# Patient Record
Sex: FEMALE | Race: BLACK OR AFRICAN AMERICAN | Employment: OTHER | URBAN - METROPOLITAN AREA
[De-identification: names, ages, dates, MRNs, and addresses within clinical notes are randomized per-mention and may not be internally consistent; named-entity substitution may affect disease eponyms.]

---

## 2019-02-11 ENCOUNTER — HOSPITAL ENCOUNTER (OUTPATIENT)
Dept: LAB | Age: 44
Discharge: HOME | End: 2019-02-11

## 2020-06-16 ENCOUNTER — VIRTUAL VISIT (OUTPATIENT)
Dept: FAMILY MEDICINE CLINIC | Age: 45
End: 2020-06-16

## 2020-06-16 DIAGNOSIS — R07.9 CHEST PAIN, UNSPECIFIED TYPE: ICD-10-CM

## 2020-06-16 DIAGNOSIS — H91.91 HEARING LOSS OF RIGHT EAR, UNSPECIFIED HEARING LOSS TYPE: ICD-10-CM

## 2020-06-16 DIAGNOSIS — Z76.89 ENCOUNTER TO ESTABLISH CARE: Primary | ICD-10-CM

## 2020-06-16 DIAGNOSIS — L60.8 LONGITUDINAL MELANONYCHIA: ICD-10-CM

## 2020-06-16 DIAGNOSIS — L60.2 THICKENED NAIL: ICD-10-CM

## 2020-06-16 DIAGNOSIS — Z87.09 HISTORY OF ASTHMA: ICD-10-CM

## 2020-06-16 RX ORDER — UREA 10 %
LOTION (ML) TOPICAL
COMMUNITY

## 2020-06-16 RX ORDER — MAGNESIUM OXIDE 240 MG
POWDER IN PACKET (EA) ORAL
COMMUNITY

## 2020-06-16 RX ORDER — CHOLECALCIFEROL (VITAMIN D3) 125 MCG
CAPSULE ORAL
COMMUNITY

## 2020-06-16 NOTE — PROGRESS NOTES
Chief Complaint   Patient presents with    New Patient     establish care    Chest Pain     \"lightening bult sensation\", random/ no pattern, x march 2020     Nail Problem     1. Have you been to the ER, urgent care clinic since your last visit? Hospitalized since your last visit? No    2. Have you seen or consulted any other health care providers outside of the [address] [place] [address] since your last visit? Include any pap smears or colon screening.  No

## 2020-06-16 NOTE — PROGRESS NOTES
Angie Coker [facility] Note      Subjective:     Chief Complaint   Patient presents with    New Patient     establish care    Chest Pain     \"lightening bult sensation\", random/ no pattern, x 2020     Nail Problem     Wesley Arguelles is a 53y.o. year old female who presents for virtual evaluation of the following:    Establishment of Care:  Previous PCP: Dr. Abel Sheehan, last seen 2019  Patient Care Team:  Elif Parekh MD as PCP - [facility])   Dentist- None  Optho- None  GYN- Dr Fab Centeno. PMH:   Meniere disease:  Right ear hearing loss, borderling of hearing loss  Tx: None  Previously managed by ENT    Asthma   Chronic since childhood  Tx: None  Triggers: allergies to shlefish and pistacio nuts, URI  Denies recent symptoms     Acute Concerns:  Chest pain  Onset: 2020  Character: \"lightning strikes\"  Across chest  Location: anterior chest  Duration:seconds to 1 minute  Frequency: random, last occurred 2 weeks ago  Current Pain Ratin  Treatment: None  Endorses: None  None smoker  Denies sweating, shortness of breath, fever, vomiting, diarrhea, constipation, chest tenderness      History of Insulin Resistance/ History of Gestational Diabetes  Tx: None  No results found for: HBA1C, HGBE8, GBK9LPQZ, XJR8TZSI      Toenail Abnormality:  Onset  Big toes with thickening of toenails  Big toes and left 2nd toe with dark lines  Denies pain, change in size of dark lines      Social:   Employment- unknown  Lives with unknown        Review of Systems   Pertinent positives and negative per HPI. All other systems  reviewed are negative for a Comprehensive ROS (10+).        Past Medical History:   Diagnosis Date    Hearing loss in right ear     Meniere disease         Social History     Socioeconomic History    Marital status:      Spouse name: Not on file    Number of children: Not on file    Years of education: Not on file    Highest education level: Not on file   Occupational History    Not on file   Social Needs    Financial resource strain: Not on file    Food insecurity     Worry: Not on file     Inability: Not on file    Transportation needs     Medical: Not on file     Non-medical: Not on file   Tobacco Use    Smoking status: Never Smoker    Smokeless tobacco: Never Used   Substance and Sexual Activity    Alcohol use: Yes     Comment: socially     Drug use: Never    Sexual activity: Yes     Partners: Male   Lifestyle    Physical activity     Days per week: Not on file     Minutes per session: Not on file    Stress: Not on file   Relationships    Social connections     Talks on phone: Not on file     Gets together: Not on file     Attends Sabianism service: Not on file     Active member of club or organization: Not on file     Attends meetings of clubs or organizations: Not on file     Relationship status: Not on file    Intimate partner violence     Fear of current or ex partner: Not on file     Emotionally abused: Not on file     Physically abused: Not on file     Forced sexual activity: Not on file   Other Topics Concern    Not on file   Social History Narrative    Not on file       Family History   Problem Relation Age of Onset    Hypertension Mother     No Known Problems Father     MS Sister     Diabetes Brother     Heart Attack Paternal Aunt     Alzheimer Paternal Uncle        Current Outpatient Medications   Medication Sig    BRE EXTRACT PO bre extract    melatonin 1 mg tablet melatonin    ascorbic acid, vitamin C, (ascorbic acid) 100 mg tab Vitamin C    blue-green algae (SPIRULINA) Spirulina    elderberry fruit (ELDERBERRY PO) Take  by mouth.  cholecalciferol, vitamin D3, (Vitamin D3) 50 mcg (2,000 unit) tab Take  by mouth.  magnesium oxide 240 mg magnesium pwpk Take  by mouth.  Omega-3 Fatty Acids 60- mg cpDR Take  by mouth. No current facility-administered medications for this visit. Objective:   No flowsheet data found. Vitals measurement not available       Physical Examination:  General: Alert, cooperative, no distress, appears stated age. Eyes: Conjunctivae clear. Pupils equally round. Extraocular muscles intact. Ears: Normal appearing external ear   Nose: Nares normal appearing  Mouth/Throat: Lips, mucosa, and tongue normal. Moist mucous membranes. No tonsillar enlargement noted. Neck: Supple, symmetrical, trachea midline, no neck mass visualized. Respiratory: Breathing comfortably, in no acute respiratory distress. Cardiovascular: Visualized extremities without edema. MSK: Upper extremities normal appearing. Skin:   - great toenails with small area of thickening distally. - great toenails and left 2nd toe with longitudinal hyperpigmentation streaks  Neurologic: No facial asymmetry. Normal gaze. Cranial nerves intact. Psychiatric: Affect appropriate. Mood euthymic. Thoughts logical. Speech volume and speed normal. No hallucinations. Well kempt. No visits with results within 3 Month(s) from this visit. Latest known visit with results is:   No results found for any previous visit. Assessment/ Plan:   Diagnoses and all orders for this visit:    1. Encounter to establish care    2. Chest pain, unspecified type    3. Longitudinal melanonychia    4. Thickened nail    5. Hearing loss of right ear, unspecified hearing loss type    6. History of asthma      For today's visit, I did the following:  · Reviewed PMH as listed in orders  · Refilled meds for chronic conditions, per orders  · Reviewed labs in detail or ordered lab  Chest pain with unclear etiology but likely musculoskeletal cause vs GERD. Keep pain diary. Need in office visit for evaluation with EKG if recurs. Consider stress test if persists. Longitudinal melanonychia of toenail. Likely benign change. Will measure largest streak when in office to ensure <3mm if not then referral to derm. Thickened toenail, Advised patient provide toenail sample for fungal culture. Negative depression screen. Hearing loss reported. Follow up with ENT per routine. History of asthma,  asymptomatic. Follow up with specialists per routine. We discussed the expected course, resolution and complications of the diagnosis(es) in detail. Medication risks, benefits, costs, interactions, and alternatives were discussed as indicated. I advised her to contact the office if her condition worsens, changes or fails to improve as anticipated. She expressed understanding with the diagnosis(es) and plan. Denisse Gan is a 53 y.o. female being evaluated by a video visit encounter for concerns as above. A caregiver was present when appropriate. Due to this being a TeleHealth encounter (During [facility] public health emergency), evaluation of the following organ systems was limited: Vitals/Constitutional/EENT/Resp/CV/GI//MS/Neuro/Skin/Heme-Lymph-Imm. Pursuant to the emergency declaration under the [facility], 1135 waiver authority and the [employer] and [employer] Act, this Virtual  Visit was conducted, with patient's (and/or legal guardian's) consent, to reduce the patient's risk of exposure to COVID-19 and provide necessary medical care. Services were provided through a video synchronous discussion virtually to substitute for in-person clinic visit. Provider was at home while conducting this encounter. Patient was at home during encounter. Other persons participating in call: None  Consent:  She and/or her healthcare decision maker is aware that this patient-initiated Telehealth encounter is a billable service, with coverage as determined by her insurance carrier. She is aware that she may receive a bill and has provided verbal consent to proceed: Yes  This virtual visit was conducted via [employer] me.   Pursuant to the emergency declaration under the [facility], [facility] waiver authority and the [employer] and [employer] Act, this Virtual  Visit was conducted to reduce the patient's risk of exposure to COVID-19 and provide continuity of care for an established patient. Services were provided through a video synchronous discussion virtually to substitute for in-person clinic visit. Due to this being a TeleHealth evaluation, many elements of the physical examination are unable to be assessed. Total Time: minutes: 21-30 minutes. Educated patient on red flag symptoms to warrant return to clinic or emergency room visit. I have discussed the diagnosis with the patient and the intended plan as seen in the above orders. The patient has been offered or received an after-visit summary and questions were answered concerning future plans. I have discussed medication side effects and warnings with the patient as well. Follow-up and Dispositions    · Return if symptoms worsen or fail to improve.          Signed,    Everardo Oliveira MD  6/16/2020

## 2020-06-18 NOTE — PATIENT INSTRUCTIONS

## 2021-01-11 ENCOUNTER — NURSE TRIAGE (OUTPATIENT)
Dept: OTHER | Facility: CLINIC | Age: 46
End: 2021-01-11

## 2021-01-11 NOTE — TELEPHONE ENCOUNTER
Patient called [facility] with red flag complaint. Hard transfer hua received from [facility]. Brief description of triage: Patient wants to schedule an appointment, states she was seen in the ER the other day and was instructed to make an appointment to monitor her health and after effects of COVID. Patient states daily chest tightness for which she is doing breathing treatments and notes an increased heart rate. Patient denies any new or worsening symptoms since ER visit 1/8/21; denies current chest pain, SOB or difficulty breathing. No triage, information only. Care advice provided, patient verbalizes understanding; denies any other questions or concerns; instructed to call back for any new or worsening symptoms. Writer provided warm transfer to [facility] for appointment scheduling. Reason for Disposition   Requesting regular office appointment    Answer Assessment - Initial Assessment Questions  1. REASON FOR CALL or QUESTION: \"What is your reason for calling today? \" or \"How can I best help you? \" or \"What question do you have that I can help answer? \"      Brief description of triage: Patient wants to schedule an appointment, states she was seen in the ER the other day and was instructed to make an appointment to monitor her health and after effects of COVID. Patient states daily chest tightness for which she is doing breathing treatments and notes an increased heart rate. Patient denies any new or worsening symptoms since ER visit 1/8/21; denies current chest pain, SOB or difficulty breathing. No triage, information only. Care advice provided, patient verbalizes understanding; denies any other questions or concerns; instructed to call back for any new or worsening symptoms. Protocols used: INFORMATION ONLY CALL - NO TRIAGE-[facility]    Attention Provider: Thank you for allowing me to participate in the care of your patient.  The patient was connected to triage in response to information from calling the [employer]. Please do not respond through this encounter as the response is not directed to a shared pool.

## 2021-01-12 ENCOUNTER — OFFICE VISIT (OUTPATIENT)
Dept: FAMILY MEDICINE CLINIC | Age: 46
End: 2021-01-12
Payer: MEDICAID

## 2021-01-12 VITALS
HEIGHT: 64 IN | OXYGEN SATURATION: 98 % | RESPIRATION RATE: 18 BRPM | DIASTOLIC BLOOD PRESSURE: 87 MMHG | WEIGHT: 178.6 LBS | TEMPERATURE: 97.6 F | BODY MASS INDEX: 30.49 KG/M2 | HEART RATE: 101 BPM | SYSTOLIC BLOOD PRESSURE: 127 MMHG

## 2021-01-12 DIAGNOSIS — Z86.16 HISTORY OF COVID-19: ICD-10-CM

## 2021-01-12 DIAGNOSIS — Z23 NEEDS FLU SHOT: ICD-10-CM

## 2021-01-12 DIAGNOSIS — R00.2 HEART PALPITATIONS: Primary | ICD-10-CM

## 2021-01-12 DIAGNOSIS — G93.31 POSTVIRAL FATIGUE SYNDROME: ICD-10-CM

## 2021-01-12 DIAGNOSIS — R06.02 SHORTNESS OF BREATH: ICD-10-CM

## 2021-01-12 PROCEDURE — 99213 OFFICE O/P EST LOW 20 MIN: CPT | Performed by: NURSE PRACTITIONER

## 2021-01-12 PROCEDURE — 90471 IMMUNIZATION ADMIN: CPT | Performed by: NURSE PRACTITIONER

## 2021-01-12 PROCEDURE — 90686 IIV4 VACC NO PRSV 0.5 ML IM: CPT | Performed by: NURSE PRACTITIONER

## 2021-01-12 RX ORDER — ALBUTEROL SULFATE 1.25 MG/3ML
SOLUTION RESPIRATORY (INHALATION)
COMMUNITY
Start: 2021-01-07 | End: 2021-01-18 | Stop reason: SDUPTHER

## 2021-01-12 RX ORDER — ALBUTEROL SULFATE 90 UG/1
AEROSOL, METERED RESPIRATORY (INHALATION)
COMMUNITY
Start: 2020-12-14 | End: 2021-01-18 | Stop reason: SDUPTHER

## 2021-01-12 RX ORDER — INHALER, ASSIST DEVICES
SPACER (EA) MISCELLANEOUS
COMMUNITY
Start: 2020-12-17

## 2021-01-12 NOTE — PROGRESS NOTES
Assessment/Plan:     Diagnoses and all orders for this visit:    1. Heart palpitations  -     ECHO ADULT COMPLETE; Future    2. Shortness of breath  -     PULMONARY FUNCTION TEST; Future  -     ECHO ADULT COMPLETE; Future    3. Postviral fatigue syndrome  -     ECHO ADULT COMPLETE; Future    4. History of COVID-19  -     ECHO ADULT COMPLETE; Future      Testing as above pending. Continue Albuterol every 4 hours as needed. Follow up will be based on results. ER if symptoms worsen. Discussed expected course/resolution/complications of diagnosis in detail with patient. Medication risks/benefits/costs/interactions/alternatives discussed with patient. Pt was given after visit summary which includes diagnoses, current medications & vitals. Pt expressed understanding with the diagnosis and plan          Subjective:      Caitlyn Aleman is a 50 y.o. female who presents for had concerns including Shortness of Breath (SOB and chest tightness. occasionally heart rate with elevate randomly even when sitting and relaxed ), Fatigue, and Mole (mole on the LT arm that appeared over the summer ). Reports a history of COVID-19 one months ago. Continues with ongoing symptoms including shortness of breath, chest tightness, heart palpitations, and fatigue. She was diagnosed on 12/12 and treated with Medrol Dose Pack and  Doxycycline. She went to [facility]'s ER on 12/18 and was treated with additional antibiotics. She went back to [address] [place] [address] ER on 1/8 and received additional Medrol Dose Pack. There is no problem list on file for this patient.       Current Outpatient Medications   Medication Sig Dispense Refill    albuterol (ACCUNEB) 1.25 mg/3 mL nebu       ProAir HFA 90 mcg/actuation inhaler INHALE 2 PUFFS EVERY 4 TO 6 HOURS AS NEEDED FOR BREATHING      OptiChamber Aneta VHC       BRE EXTRACT PO bre extract      ascorbic acid, vitamin C, (ascorbic acid) 100 mg tab Vitamin C      blue-green algae (SPIRULINA) Spirulina      elderberry fruit (ELDERBERRY PO) Take  by mouth.  cholecalciferol, vitamin D3, (Vitamin D3) 50 mcg (2,000 unit) tab Take  by mouth.  magnesium oxide 240 mg magnesium pwpk Take  by mouth.  Omega-3 Fatty Acids 60- mg cpDR Take  by mouth.  melatonin 1 mg tablet melatonin         Allergies   Allergen Reactions    Tree Nut Anaphylaxis       ROS:   Review of Systems   Constitutional: Positive for malaise/fatigue. Negative for chills and fever. HENT: Negative for congestion, ear pain, sinus pain and sore throat. Respiratory: Positive for shortness of breath and wheezing. Negative for cough and sputum production. Cardiovascular: Positive for palpitations. Negative for chest pain. Neurological: Negative for seizures. Endo/Heme/Allergies: Negative for environmental allergies. Objective:     Visit Vitals  /87 (BP 1 Location: Left arm, BP Patient Position: Sitting)   Pulse (!) 101   Temp 97.6 °F (36.4 °C) (Temporal)   Resp 18   Ht 5' 4\" (1.626 m)   Wt 178 lb 9.6 oz (81 kg)   LMP 01/08/2021 (Exact Date)   SpO2 98%   BMI 30.66 kg/m²       Vitals and Nurse Documentation reviewed. Physical Exam  Constitutional:       General: She is not in acute distress. HENT:      Right Ear: No middle ear effusion. Tympanic membrane is not erythematous or bulging. Left Ear:  No middle ear effusion. Tympanic membrane is not erythematous or bulging. Nose: No rhinorrhea. Right Sinus: No maxillary sinus tenderness or frontal sinus tenderness. Left Sinus: No maxillary sinus tenderness or frontal sinus tenderness. Mouth/Throat:      Pharynx: No oropharyngeal exudate or posterior oropharyngeal erythema. Eyes:      General: Lids are normal.   Cardiovascular:      Heart sounds: S1 normal and S2 normal. No murmur. No friction rub. No gallop. Pulmonary:      Breath sounds: Normal breath sounds. No wheezing.    Lymphadenopathy: Cervical: No cervical adenopathy. Skin:     General: Skin is warm and dry.    Psychiatric:         Mood and Affect: Mood and affect normal.

## 2021-01-12 NOTE — PROGRESS NOTES
Chief Complaint   Patient presents with    Shortness of Breath     SOB and chest tightness. occasionally heart rate with elevate randomly even when sitting and relaxed     Fatigue    Mole     mole on the LT arm that appeared over the summer      1. Have you been to the ER, urgent care clinic since your last visit? Hospitalized since your last visit? Yes, 1/8/21 to [facility] for todays chief complaint. 2. Have you seen or consulted any other health care providers outside of the [address] [place] [address] since your last visit? Include any pap smears or colon screening.  Yes, DR. Abraham Jurado  - patient first

## 2021-01-14 ENCOUNTER — TRANSCRIBE ORDER (OUTPATIENT)
Dept: REGISTRATION | Age: 46
End: 2021-01-14

## 2021-01-14 ENCOUNTER — HOSPITAL ENCOUNTER (OUTPATIENT)
Dept: PREADMISSION TESTING | Age: 46
Discharge: HOME | End: 2021-01-14
Payer: MEDICAID

## 2021-01-14 ENCOUNTER — HOSPITAL ENCOUNTER (OUTPATIENT)
Dept: NON INVASIVE DIAGNOSTICS | Age: 46
Discharge: HOME | End: 2021-01-14
Attending: NURSE PRACTITIONER
Payer: MEDICAID

## 2021-01-14 VITALS — HEIGHT: 64 IN | BODY MASS INDEX: 30.49 KG/M2 | WEIGHT: 178.57 LBS

## 2021-01-14 DIAGNOSIS — G93.31 POSTVIRAL FATIGUE SYNDROME: ICD-10-CM

## 2021-01-14 DIAGNOSIS — Z01.812 PRE-PROCEDURE LAB EXAM: Primary | ICD-10-CM

## 2021-01-14 DIAGNOSIS — Z86.16 HISTORY OF COVID-19: ICD-10-CM

## 2021-01-14 DIAGNOSIS — Z01.812 PRE-PROCEDURE LAB EXAM: ICD-10-CM

## 2021-01-14 DIAGNOSIS — R00.2 HEART PALPITATIONS: ICD-10-CM

## 2021-01-14 DIAGNOSIS — R06.02 SHORTNESS OF BREATH: ICD-10-CM

## 2021-01-14 PROCEDURE — U0003 INFECTIOUS AGENT DETECTION BY NUCLEIC ACID (DNA OR RNA); SEVERE ACUTE RESPIRATORY SYNDROME CORONAVIRUS 2 (SARS-COV-2) (CORONAVIRUS DISEASE [COVID-19]), AMPLIFIED PROBE TECHNIQUE, MAKING USE OF HIGH THROUGHPUT TECHNOLOGIES AS DESCRIBED BY CMS-2020-01-R: HCPCS

## 2021-01-14 PROCEDURE — 93306 TTE W/DOPPLER COMPLETE: CPT

## 2021-01-15 LAB — SARS-COV-2, COV2NT: NOT DETECTED

## 2021-01-18 ENCOUNTER — HOSPITAL ENCOUNTER (OUTPATIENT)
Dept: PULMONOLOGY | Age: 46
Discharge: HOME | End: 2021-01-18
Attending: NURSE PRACTITIONER
Payer: MEDICAID

## 2021-01-18 DIAGNOSIS — R06.02 SHORTNESS OF BREATH: ICD-10-CM

## 2021-01-18 LAB
ECHO AV AREA PEAK VELOCITY: 2.31 CM2
ECHO AV AREA/BSA PEAK VELOCITY: 1.2 CM2/M2
ECHO AV PEAK GRADIENT: 6.01 MMHG
ECHO AV PEAK VELOCITY: 122.59 CM/S
ECHO LA AREA 4C: 14.36 CM2
ECHO LA TO AORTIC ROOT RATIO: 1.22
ECHO LA TO AORTIC ROOT RATIO: 1.22
ECHO LA VOL 4C: 34.12 ML (ref 22–52)
ECHO LA VOLUME INDEX A4C: 18.3 ML/M2 (ref 16–28)
ECHO LV INTERNAL DIMENSION DIASTOLIC: 3.5 CM (ref 3.9–5.3)
ECHO LV INTERNAL DIMENSION SYSTOLIC: 2.34 CM
ECHO LV IVSD: 0.9 CM (ref 0.6–0.9)
ECHO LV MASS 2D: 109.5 G (ref 67–162)
ECHO LV MASS INDEX 2D: 58.7 G/M2 (ref 43–95)
ECHO LV POSTERIOR WALL DIASTOLIC: 1.18 CM (ref 0.6–0.9)
ECHO LVOT DIAM: 1.87 CM
ECHO LVOT PEAK GRADIENT: 4.25 MMHG
ECHO LVOT PEAK VELOCITY: 103.07 CM/S
ECHO MV A VELOCITY: 57.29 CM/S
ECHO MV E VELOCITY: 62.76 CM/S
ECHO MV E/A RATIO: 1.1
ECHO RV INTERNAL DIMENSION: 3.79 CM
ECHO RV TAPSE: 1.9 CM (ref 1.5–2)
ECHO TV REGURGITANT MAX VELOCITY: 197.22 CM/S
ECHO TV REGURGITANT PEAK GRADIENT: 15.56 MMHG

## 2021-01-18 PROCEDURE — 94010 BREATHING CAPACITY TEST: CPT

## 2021-01-19 RX ORDER — ALBUTEROL SULFATE 1.25 MG/3ML
1.25 SOLUTION RESPIRATORY (INHALATION)
Qty: 30 NEBULE | Refills: 3 | Status: SHIPPED | OUTPATIENT
Start: 2021-01-19

## 2021-02-11 NOTE — PROGRESS NOTES
[facility] [place]: [place]  [phone]) [phone]    HPI:   Ms. Hany Dickens is a 49 yo F with asthma, family history of early coronary artery disease, gestational diabetes and insulin resistance referred by Dr. Mi Zelaya for cardiac evaluation. She is here due to various symptoms including palpitations. She did have a severe bout of Covid back in December and since that she had mostly pulmonary issues that lasted several weeks. She completed a round of steroids and antibiotics. She says covid did reactivate her asthma and she saw Dr. Guy Cornejo with pulmonary and was started on an inhaler. She says she had not had issues with asthma since she was a child. She denies any exertional chest pains. She also been having episodes of palpitations where she notes that her heart rate was spiking up in the 140s to 150s bpm.  She says this is of been a little bit less frequent at home. Last Wednesday she had episode where all of a sudden she felt a \"heat wave\" come through her chest and lasting several seconds and was associated with lightheadedness. She felt \"off\" the rest of the day. She did not have a recurrence yesterday. She does admit to some weight gain secondary to steroids. She did have an echo on January 14 that was overall normal with a EF of 55% mild mitral and tricuspid regurgitation. She had some concern about these findings but I do not think this represents significant valvular disease. I did review the images personally. She is compensated on exam with clear lungs and no lower extremity edema. Her blood pressure is 120/82. Her EKG is normal sinus rhythm heart rate of 86 nonspecific ST-T wave. Soc hx. No tobacco. Works . Fam hx. Paternal aunt MI early CAD. Paternal cousin 45s yo CAD. Assessment/Plan:  1. Palpitations. Will obtain a loop monitor to evaluate for possible arrhythmia. She already had an echo that was overall normal we discussed this. 2. Shortness of breath, asthma. Followed by pulmonary Dr. Nunu Gibson she already had an echocardiogram.   3. Family history of early coronary artery disease. 4. Gestational diabetes     She  has a past medical history of COVID-19 (12/14/2020), Hearing loss in right ear, and Meniere disease. All other systems negative except as above. PE  Vitals:    02/12/21 1005   BP: 120/82   Pulse: 99   Resp: 18   SpO2: 100%   Weight: 181 lb (82.1 kg)   Height: 5' 4\" (1.626 m)    Body mass index is 31.07 kg/m².    General appearance - alert, well appearing, and in no distress  Mental status - affect appropriate to mood  Eyes - sclera anicteric, moist mucous membranes  Neck - supple, no JVD  Chest - clear to auscultation, no wheezes, rales or rhonchi  Heart - normal rate, regular rhythm, normal S1, S2, no murmurs, rubs, clicks or gallops  Abdomen - soft, nontender, nondistended, no masses or organomegaly  Neurological -  no focal deficit  Extremities - peripheral pulses normal, no pedal edema      Recent Labs:  No results found for: CHOL, CHOLX, CHLST, CHOLV, 151358, HDL, HDLP, LDL, LDLC, DLDLP, TGLX, TRIGL, TRIGP, CHHD, CHHDX  No results found for: ONEIDA, CREAPOC, [facility], CREA, REFC3, REFC4  No results found for: BUN, BUNPOC, IBUN, MBUNV, BUNV  No results found for: K, KI, PLK, WBK  No results found for: HBA1C, HGBE8, RNE1BHZT, HFV4TTWZ  No results found for: HGBPOC, HGB, HGBP, HGBEXT, HGBEXT  No results found for: PLT, PLTEXT, PLTEXT    Reviewed:  Past Medical History:   Diagnosis Date    COVID-19 12/14/2020    Hearing loss in right ear     Meniere disease      Social History     Tobacco Use   Smoking Status Never Smoker   Smokeless Tobacco Never Used     Social History     Substance and Sexual Activity   Alcohol Use Yes    Comment: socially      Allergies   Allergen Reactions    Tree Nut Anaphylaxis       Current Outpatient Medications   Medication Sig    fluticasone furoate-vilanteroL (Breo Ellipta) 200-25 mcg/dose inhaler Take 1 Puff by inhalation daily.    albuterol (ACCUNEB) 1.25 mg/3 mL nebu 3 mL by Nebulization route every four (4) hours as needed for Wheezing.  OptiChamber Aneta VHC     ascorbic acid, vitamin C, (ascorbic acid) 100 mg tab Vitamin C    elderberry fruit (ELDERBERRY PO) Take  by mouth.  cholecalciferol, vitamin D3, (Vitamin D3) 50 mcg (2,000 unit) tab Take  by mouth.  magnesium oxide 240 mg magnesium pwpk Take  by mouth.  Omega-3 Fatty Acids 60- mg cpDR Take  by mouth.  BRE EXTRACT PO bre extract    melatonin 1 mg tablet melatonin    blue-green algae (SPIRULINA) Spirulina     No current facility-administered medications for this visit.         Santos Mark MD  [facility] heart and [facility] [place]  [facility] [address] [facility] [address]  [address]

## 2021-02-12 ENCOUNTER — OFFICE VISIT (OUTPATIENT)
Dept: CARDIOLOGY CLINIC | Age: 46
End: 2021-02-12
Payer: MEDICAID

## 2021-02-12 ENCOUNTER — TELEPHONE (OUTPATIENT)
Dept: CARDIOLOGY CLINIC | Age: 46
End: 2021-02-12

## 2021-02-12 VITALS
BODY MASS INDEX: 30.9 KG/M2 | DIASTOLIC BLOOD PRESSURE: 82 MMHG | HEART RATE: 99 BPM | RESPIRATION RATE: 18 BRPM | OXYGEN SATURATION: 100 % | HEIGHT: 64 IN | WEIGHT: 181 LBS | SYSTOLIC BLOOD PRESSURE: 120 MMHG

## 2021-02-12 DIAGNOSIS — R06.02 SHORTNESS OF BREATH: ICD-10-CM

## 2021-02-12 DIAGNOSIS — Z86.16 HISTORY OF 2019 NOVEL CORONAVIRUS DISEASE (COVID-19): ICD-10-CM

## 2021-02-12 DIAGNOSIS — R00.2 HEART PALPITATIONS: Primary | ICD-10-CM

## 2021-02-12 PROCEDURE — 99244 OFF/OP CNSLTJ NEW/EST MOD 40: CPT | Performed by: INTERNAL MEDICINE

## 2021-02-12 PROCEDURE — 93000 ELECTROCARDIOGRAM COMPLETE: CPT | Performed by: INTERNAL MEDICINE

## 2021-02-12 RX ORDER — FLUTICASONE FUROATE AND VILANTEROL TRIFENATATE 200; 25 UG/1; UG/1
1 POWDER RESPIRATORY (INHALATION) DAILY
COMMUNITY

## 2021-02-12 NOTE — TELEPHONE ENCOUNTER
Enrolled with [employer] - Ordered and being shipped to patient's home address on file. ETA within 5-7 business days.        ----- Message from Shakila Garcia LPN sent at 4/27/7662 10:44 AM EST -----  Regarding: Loop  Please send the pt 30 day loop for palptations

## 2021-02-23 ENCOUNTER — DOCUMENTATION ONLY (OUTPATIENT)
Dept: CARDIOLOGY CLINIC | Age: 46
End: 2021-02-23

## 2021-03-09 NOTE — PROCEDURES
[address]  PULMONARY FUNCTION TEST    Name:  Mary Martins  MR#:  [id number]  :  1975  ACCOUNT #:  [de-identified]  DATE OF SERVICE:  2021      Spirometry was performed and is normal without any evidence of airflow obstruction or restriction.   Flow volume loop is also normal.        Patricia Montelongo DO      [id number]/  D:  2021 14:26  T:  2021 17:06  JOB #:  [id number]

## 2021-04-07 ENCOUNTER — TELEPHONE (OUTPATIENT)
Dept: CARDIOLOGY CLINIC | Age: 46
End: 2021-04-07

## 2021-04-07 NOTE — TELEPHONE ENCOUNTER
Patient calling in following up on test that were done prior. She would like a call back at [phone].

## 2021-04-07 NOTE — TELEPHONE ENCOUNTER
Returned call to patient. Two patient indentifiers verified. Pt was informed that Dr. Debborah Favre is out of the office this week but we do have the final report and once he is back and gives the results pt will be called. Pt verbalized understanding and denies any further questions at this time.

## 2021-04-14 ENCOUNTER — TELEPHONE (OUTPATIENT)
Dept: CARDIOLOGY CLINIC | Age: 46
End: 2021-04-14

## 2021-04-14 DIAGNOSIS — I47.1 SVT (SUPRAVENTRICULAR TACHYCARDIA) (HCC): Primary | ICD-10-CM

## 2021-04-14 RX ORDER — METOPROLOL SUCCINATE 25 MG/1
25 TABLET, EXTENDED RELEASE ORAL DAILY
Qty: 30 TAB | Refills: 5 | Status: SHIPPED | OUTPATIENT
Start: 2021-04-14

## 2021-04-14 NOTE — TELEPHONE ENCOUNTER
----- Message from Joan Tobias MD sent at 4/13/2021  6:20 PM EDT -----  Please let pt know her loop showed a few very brief but fast heart beats (either ventricular tachycardia or SVT). Based on this, we should start her on toprol 25 mg once daily and get a stress test within the next few weeks (lexiscan nuc: reason, unstable angina and non sustained ventricular tachycardia).  Please set up follow up with me within a few days post stress test. thx

## 2021-04-14 NOTE — TELEPHONE ENCOUNTER
Returned call to patient. Two patient indentifiers verified. Pt was informed of the message. Pt asked if she should take in the morning or afternoon. Pt was informed to try to take in the evening. Pt verabalized understanding and denies any further questions.      Future Appointments   Date Time Provider Fransisco Rosales   4/30/2021 12:30 PM KUSUM SHAH   5/5/2021  8:40 AM MD SULAIMAN Engel AMB

## 2021-04-14 NOTE — TELEPHONE ENCOUNTER
Patient is calling as she was called in a new prescription and was reading it over and it states to consult a doctor if you have asthma in which she does and would like to ensure that it is okay that she start this medication. Please advise.     Phone: [phone]

## 2021-04-14 NOTE — TELEPHONE ENCOUNTER
Hassell Castleman, MD Jolene Loop, RN   Caller: Unspecified (Today,  1:35 PM)             Yes, ok to take. At low dose, shouldn't effect asthma.  thx

## 2021-04-14 NOTE — TELEPHONE ENCOUNTER
Called patient. Two patient indentifiers verified. Pt was informed of test results. Pt was scheduled for testing and follow up. Pharmacy verified. Pt given instructions over the phone for nuclear stress test. Pt denies any further questions at this time.      Future Appointments   Date Time Provider Fransisco Rosales   4/30/2021 12:30 PM KUSUM SHAH   5/5/2021  8:40 AM MD SULAIMAN Snyder AMB

## 2021-04-30 ENCOUNTER — ANCILLARY PROCEDURE (OUTPATIENT)
Dept: CARDIOLOGY CLINIC | Age: 46
End: 2021-04-30
Payer: MEDICAID

## 2021-04-30 VITALS
BODY MASS INDEX: 30.9 KG/M2 | WEIGHT: 181 LBS | DIASTOLIC BLOOD PRESSURE: 66 MMHG | SYSTOLIC BLOOD PRESSURE: 110 MMHG | HEIGHT: 64 IN

## 2021-04-30 DIAGNOSIS — I47.1 SVT (SUPRAVENTRICULAR TACHYCARDIA) (HCC): ICD-10-CM

## 2021-04-30 PROCEDURE — 93015 CV STRESS TEST SUPVJ I&R: CPT | Performed by: INTERNAL MEDICINE

## 2021-04-30 PROCEDURE — A9500 TC99M SESTAMIBI: HCPCS | Performed by: INTERNAL MEDICINE

## 2021-04-30 PROCEDURE — 78452 HT MUSCLE IMAGE SPECT MULT: CPT | Performed by: INTERNAL MEDICINE

## 2021-04-30 RX ORDER — TETRAKIS(2-METHOXYISOBUTYLISOCYANIDE)COPPER(I) TETRAFLUOROBORATE 1 MG/ML
30 INJECTION, POWDER, LYOPHILIZED, FOR SOLUTION INTRAVENOUS ONCE
Status: COMPLETED | OUTPATIENT
Start: 2021-04-30 | End: 2021-04-30

## 2021-04-30 RX ORDER — TETRAKIS(2-METHOXYISOBUTYLISOCYANIDE)COPPER(I) TETRAFLUOROBORATE 1 MG/ML
10 INJECTION, POWDER, LYOPHILIZED, FOR SOLUTION INTRAVENOUS ONCE
Status: COMPLETED | OUTPATIENT
Start: 2021-04-30 | End: 2021-04-30

## 2021-04-30 RX ADMIN — TETRAKIS(2-METHOXYISOBUTYLISOCYANIDE)COPPER(I) TETRAFLUOROBORATE 25.3 MILLICURIE: 1 INJECTION, POWDER, LYOPHILIZED, FOR SOLUTION INTRAVENOUS at 14:15

## 2021-04-30 RX ADMIN — TETRAKIS(2-METHOXYISOBUTYLISOCYANIDE)COPPER(I) TETRAFLUOROBORATE 8.7 MILLICURIE: 1 INJECTION, POWDER, LYOPHILIZED, FOR SOLUTION INTRAVENOUS at 12:50

## 2021-05-02 LAB
STRESS BASELINE DIAS BP: 66 MMHG
STRESS BASELINE HR: 75 BPM
STRESS BASELINE SYS BP: 110 MMHG
STRESS O2 SAT PEAK: 100 %
STRESS O2 SAT REST: 100 %
STRESS PEAK DIAS BP: 70 MMHG
STRESS PEAK SYS BP: 116 MMHG
STRESS PERCENT HR ACHIEVED: 66 %
STRESS POST PEAK HR: 115 BPM
STRESS RATE PRESSURE PRODUCT: NORMAL BPM*MMHG
STRESS TARGET HR: 173 BPM

## 2021-05-05 ENCOUNTER — OFFICE VISIT (OUTPATIENT)
Dept: CARDIOLOGY CLINIC | Age: 46
End: 2021-05-05
Payer: MEDICAID

## 2021-05-05 VITALS
SYSTOLIC BLOOD PRESSURE: 126 MMHG | HEIGHT: 64 IN | OXYGEN SATURATION: 99 % | DIASTOLIC BLOOD PRESSURE: 80 MMHG | WEIGHT: 176 LBS | HEART RATE: 86 BPM | BODY MASS INDEX: 30.05 KG/M2 | RESPIRATION RATE: 16 BRPM

## 2021-05-05 DIAGNOSIS — I47.1 SVT (SUPRAVENTRICULAR TACHYCARDIA) (HCC): Primary | ICD-10-CM

## 2021-05-05 DIAGNOSIS — Z82.49 FAMILY HISTORY OF EARLY CAD: ICD-10-CM

## 2021-05-05 DIAGNOSIS — R06.02 SHORTNESS OF BREATH: ICD-10-CM

## 2021-05-05 PROCEDURE — 99214 OFFICE O/P EST MOD 30 MIN: CPT | Performed by: INTERNAL MEDICINE

## 2021-05-05 RX ORDER — TIOTROPIUM BROMIDE 18 UG/1
CAPSULE ORAL; RESPIRATORY (INHALATION)
COMMUNITY
Start: 2021-04-27

## 2021-05-05 NOTE — PROGRESS NOTES
[facility] [place]: [place]  [phone]) [phone]    HPI:   Ms. Geovani Reno is a 49 yo F with asthma, family history of early coronary artery disease, gestational diabetes and insulin resistance seen initially for palpitations. Severe bout of Covid back in December 2020 and completed a round of steroids and antibiotics. Covid did reactivate her asthma followed by Dr. Aakash Smith. 1/2021 echo was overall normal with a EF of 55% mild mitral and tricuspid regurgitation. On her last visit due to palpitations, had her wear a loop monitor that demonstrated either ventricular tachycardia or SVT and proceeded with a stress test that was normal.  Started her on low dose Toprol, which she has tolerated well despite asthma. She just has very brief palpitations occurring two to three times a week, just lasting a second. She denies any chest pain or shortness of breath, lightheadedness or dizziness. She is compensated on exam with clear lungs and no lower extremity edema. Soc hx. No tobacco. Works . Fam hx. Paternal aunt MI early CAD. Paternal cousin 45s yo CAD. Assessment and Plan:   1. Paroxysmal supraventricular tachycardia. Overall well controlled on low dose beta blocker. At some point, she said she might want to wean off of this and I do think that this would be okay. I did recommend that she take the Toprol for at least six months to let things settle. As noted above, she had an echocardiogram and a test recently that were both normal.  No additional cardiac evaluation is indicated at this time. Will tentatively have her follow back in a year. 2. Asthma. Followed by pulmonary, Dr. Jarrett Wharton. 3. Family history of early coronary artery disease. 4. Gestational diabetes. She  has a past medical history of COVID-19 (12/14/2020), Hearing loss in right ear, and Meniere disease. All other systems negative except as above.      PE  Vitals:    05/05/21 0847   BP: 126/80   Pulse: 86   Resp: 16   SpO2: 99%   Weight: 176 lb (79.8 kg)   Height: 5' 4\" (1.626 m)    Body mass index is 30.21 kg/m². General appearance - alert, well appearing, and in no distress  Mental status - affect appropriate to mood  Eyes - sclera anicteric, moist mucous membranes  Neck - supple, no JVD  Chest - clear to auscultation, no wheezes, rales or rhonchi  Heart - normal rate, regular rhythm, normal S1, S2, no murmurs, rubs, clicks or gallops  Abdomen - soft, nontender, nondistended, no masses or organomegaly  Neurological -  no focal deficit  Extremities - peripheral pulses normal, no pedal edema      Recent Labs:  No results found for: CHOL, CHOLX, CHLST, CHOLV, [id number], HDL, HDLP, LDL, LDLC, DLDLP, TGLX, TRIGL, TRIGP, CHHD, CHHDX  No results found for: ONEIDA, CREAPOC, [facility], CREA, REFC3, REFC4  No results found for: BUN, BUNPOC, IBUN, MBUNV, BUNV  No results found for: K, KI, PLK, WBK  No results found for: HBA1C, HGBE8, ZXL5VCZJ, VEZ9WBAL  No results found for: HGBPOC, HGB, HGBP, HGBEXT, HGBEXT  No results found for: PLT, PLTEXT, PLTEXT    Reviewed:  Past Medical History:   Diagnosis Date    COVID-19 12/14/2020    Hearing loss in right ear     Meniere disease      Social History     Tobacco Use   Smoking Status Never Smoker   Smokeless Tobacco Never Used     Social History     Substance and Sexual Activity   Alcohol Use Yes    Comment: socially      Allergies   Allergen Reactions    Tree Nut Anaphylaxis       Current Outpatient Medications   Medication Sig    Spiriva with HandiHaler 18 mcg inhalation capsule     metoprolol succinate (TOPROL-XL) 25 mg XL tablet Take 1 Tab by mouth daily.  fluticasone furoate-vilanteroL (Breo Ellipta) 200-25 mcg/dose inhaler Take 1 Puff by inhalation daily.  albuterol (ACCUNEB) 1.25 mg/3 mL nebu 3 mL by Nebulization route every four (4) hours as needed for Wheezing.     BRE EXTRACT PO bre extract    ascorbic acid, vitamin C, (ascorbic acid) 100 mg tab Vitamin C    blue-green algae (SPIRULINA) Spirulina    elderberry fruit (ELDERBERRY PO) Take  by mouth.  cholecalciferol, vitamin D3, (Vitamin D3) 50 mcg (2,000 unit) tab Take  by mouth.  magnesium oxide 240 mg magnesium pwpk Take  by mouth.  Omega-3 Fatty Acids 60- mg cpDR Take  by mouth.  OptiChamber Aneta VHC     melatonin 1 mg tablet melatonin     No current facility-administered medications for this visit.         Annalisa Caputo MD  [facility]  [address] [facility] [address]  [address]

## 2021-11-05 ENCOUNTER — TELEPHONE (OUTPATIENT)
Dept: FAMILY MEDICINE CLINIC | Age: 46
End: 2021-11-05

## 2021-11-05 NOTE — TELEPHONE ENCOUNTER
Called, spoke to pt. Two pt identifiers confirmed. Writer drew patient an appointment for Visit r/t issues. Writer informed her if symptoms got worse go to Urgent Care or ER and she stated that she would.

## 2021-11-05 NOTE — TELEPHONE ENCOUNTER
Patient called experience a weird vision  Look like a lot of lights shining.     Requesting a call back    Best call back #[phone]

## 2021-11-17 ENCOUNTER — TELEPHONE (OUTPATIENT)
Dept: FAMILY MEDICINE CLINIC | Age: 46
End: 2021-11-17

## 2021-11-17 ENCOUNTER — OFFICE VISIT (OUTPATIENT)
Dept: FAMILY MEDICINE CLINIC | Age: 46
End: 2021-11-17
Payer: MEDICAID

## 2021-11-17 VITALS
HEIGHT: 64 IN | WEIGHT: 170.8 LBS | SYSTOLIC BLOOD PRESSURE: 102 MMHG | BODY MASS INDEX: 29.16 KG/M2 | TEMPERATURE: 97.9 F | DIASTOLIC BLOOD PRESSURE: 72 MMHG | OXYGEN SATURATION: 98 % | RESPIRATION RATE: 16 BRPM | HEART RATE: 96 BPM

## 2021-11-17 DIAGNOSIS — Z13.1 DIABETES MELLITUS SCREENING: ICD-10-CM

## 2021-11-17 DIAGNOSIS — H81.01 MENIERE'S DISEASE OF RIGHT EAR: ICD-10-CM

## 2021-11-17 DIAGNOSIS — Z00.00 ROUTINE GENERAL MEDICAL EXAMINATION AT A HEALTH CARE FACILITY: Primary | ICD-10-CM

## 2021-11-17 DIAGNOSIS — Z12.11 COLON CANCER SCREENING: ICD-10-CM

## 2021-11-17 DIAGNOSIS — Z11.59 NEED FOR HEPATITIS C SCREENING TEST: ICD-10-CM

## 2021-11-17 DIAGNOSIS — H53.9: ICD-10-CM

## 2021-11-17 PROCEDURE — 99214 OFFICE O/P EST MOD 30 MIN: CPT | Performed by: NURSE PRACTITIONER

## 2021-11-17 NOTE — PROGRESS NOTES
[facility] Note     Celsa John (: 1975) is a 50 y.o. female, established patient, here for evaluation of the following chief complaint(s):  Establish Care, Eye Problem (was seeing prisms in eyes for a couple hours), and Ringing in Ear       ASSESSMENT/PLAN:  1. Routine general medical examination at a health care facility  -     CBC WITH AUTOMATED DIFF; Future  -     TSH 3RD GENERATION; Future  -     T4 (THYROXINE); Future  -     METABOLIC PANEL, COMPREHENSIVE; Future  -     LIPID PANEL; Future  -     HEPATITIS C AB; Future  -     HEMOGLOBIN A1C WITH EAG; Future  2. Meniere's disease of right ear  -Recommend follow-up with ENT    3. Temporary visual disturbance  -Visual disturbance has not reoccurred since initial episode 2 weeks ago. Discussed updating her visual examination as it has been quite some time and there has been changes to her overall site.  -Should visual disturbance (+/-headaches) reoccur would consider neurology work-up    4. Colon cancer screening  -     REFERRAL TO GASTROENTEROLOGY    5. Need for hepatitis C screening test  -     HEPATITIS C AB; Future    6. Diabetes mellitus screening  -     HEMOGLOBIN A1C WITH EAG; Future        Return in about 3 months (around 2022). SUBJECTIVE/OBJECTIVE:    Celsa John is a 50 y.o. female seen today for visual disturbance and ringing in the ear. 2 weeks ago while driving, Ms. Raffaele Jernigan had a sudden change in vision. She describes seeing prisms in her visual fields which at the time she attributed to vehicle headlights in her eyes. She noted once home and not driving the presents persisted even with her eyes closed. There have been no further instances since. There is no history of migraines. Ms. Raffaele Jernigan also reports headaches over the last 2 weeks which she points to the temporal area as where she experiences the most discomfort. Is described as dull and her whole body feels heavy.   It occurs every other day. She is not able to attribute anything that makes it worse or better. She has tried occasional Advil without significant improvement. There is a history of Ménière's disease in which she was followed by ENT. He was last evaluated by her ENT 3 to 4 years ago. She has been asymptomatic and well managed for quite some time but the being in her right ear has started again. [facility] [place], dr Sweta Fonseca / Geoff arshad      Review of Systems   Constitutional: Negative. HENT: Negative. Eyes: Negative. Respiratory: Negative. Cardiovascular: Positive for palpitations. Negative for chest pain and leg swelling. Gastrointestinal: Negative. Endocrine: Negative. Genitourinary: Negative. Musculoskeletal: Negative. Skin: Negative. Neurological: Positive for headaches. Negative for dizziness, seizures, syncope and weakness. Psychiatric/Behavioral: Negative. Wt Readings from Last 3 Encounters:   11/17/21 170 lb 12.8 oz (77.5 kg)   05/05/21 176 lb (79.8 kg)   04/30/21 181 lb (82.1 kg)     Temp Readings from Last 3 Encounters:   11/17/21 97.9 °F (36.6 °C) (Temporal)   01/12/21 97.6 °F (36.4 °C) (Temporal)     BP Readings from Last 3 Encounters:   11/17/21 102/72   05/05/21 126/80   04/30/21 110/66     Pulse Readings from Last 3 Encounters:   11/17/21 96   05/05/21 86   02/12/21 99           PHYSICAL EXAMINATION:       General: Alert, cooperative, no distress  Eyes: Conjunctivae clear. Pupils equally round and reactive to light, Extraocular muscles intact. Ears: Normal external ear canals both ears. Nose: Nares normal. Septum midline. Mucosa normal. No drainage or sinus tenderness. Mouth/Throat: Lips, mucosa, and tongue normal. No oropharyngeal erythema. No tonsillar enlargement or exudate. Neck: Supple, symmetrical, trachea midline, no adenopathy.  No thyroid enlargement/tenderness/nodules  Respiratory: Breathing comfortably, in no acute respiratory distress. Clear to auscultation bilaterally. Normal inspiratory and expiratory ratio. Cardiovascular: Regular rate and rhythm, S1, S2 normal, no murmur, click, rub or gallop. Extremities: no edema. Pulses 2+ and symmetric radial and dorsalis pedis   Abdomen: Soft, non-tender, not distended. Bowel sounds normal. No masses or organomegaly. MSK: Extremities normal appearing, atraumatic, no effusion. Gait steady and unassisted. Skin: Skin color, texture, turgor normal. No rashes or lesions on exposed skin. Lymph nodes: Cervical, supraclavicular nodes normal.  Neurologic: Cranial nerves II-XII intact. Strength 5/5 grossly. Sensation and reflexes normal throughout. Psychiatric: Normal affect. Mood euthymic. Thoughts logical. Speech volume and speed normal        Treatment risks/benefits/costs/interactions/alternatives discussed with patient. Advised patient to call back or return to office if symptoms worsen/change/persist. If patient cannot reach us or should anything more severe/urgent arise he/she should proceed directly to the nearest emergency department. Discussed expected course/resolution/complications of diagnosis in detail with patient. Patient expressed understanding with the diagnosis and plan. An electronic signature was used to authenticate this note.   -- Cordell Sanchez NP

## 2021-11-17 NOTE — PATIENT INSTRUCTIONS
Tinnitus: Care Instructions  Overview     Many people have some ringing sounds in their ears once in a while. You may hear a roar, a hiss, a tinkle, or a buzz. The sound usually lasts only a few minutes. Ringing in the ears that doesn't get better or go away is called tinnitus. Tinnitus is usually caused by long-term exposure to loud noise. This damages the nerves in the inner ear. It can occur with all types of hearing loss. It may be a symptom of almost any ear problem. Tinnitus may be caused by a buildup of earwax. Or it may be caused by ear infections or certain medicines (especially antibiotics or large amounts of aspirin). You can also hear noises in your ears because of an injury to the ears, drinking too much alcohol or caffeine, or a medical condition. You may need tests to evaluate your hearing and to find causes of long-lasting tinnitus. Your doctor may suggest one or more treatments to help you cope with it. You can also do things at home to help reduce symptoms. Follow-up care is a key part of your treatment and safety. Be sure to make and go to all appointments, and call your doctor if you are having problems. It's also a good idea to know your test results and keep a list of the medicines you take. How can you care for yourself at home? · Limit or cut out alcohol and caffeine. They can make your symptoms worse. · Do not smoke or use other tobacco products. Nicotine reduces blood flow to the ear and makes tinnitus worse. If you need help quitting, talk to your doctor about stop-smoking programs and medicines. These can increase your chances of quitting for good. · Talk to your doctor about whether to stop taking aspirin and similar products such as ibuprofen or naproxen. · Get exercise often. It can improve blood flow to the ear. Ways to cope with noise  Some tinnitus may last a long time. To cope with noise, try to:  · Avoid noises that you think caused your tinnitus.  If you can't avoid loud noises, wear earplugs or earmuffs. · Ignore the sound by paying attention to other things. · Relax using biofeedback, meditation, or yoga. Feeling stressed and being tired can make tinnitus worse. · Play music or white noise to help you sleep. Background noise may cover up the noise that you hear in your ears. You can buy a machine that makes soothing sounds, such as ocean waves. When should you call for help? Call 911 anytime you think you may need emergency care. For example, call if:    · You have symptoms of a stroke. These may include:  ? Sudden numbness, tingling, weakness, or loss of movement in your face, arm, or leg, especially on only one side of your body. ? Sudden vision changes. ? Sudden trouble speaking. ? Sudden confusion or trouble understanding simple statements. ? Sudden problems with walking or balance. ? A sudden, severe headache that is different from past headaches. Call your doctor now or seek immediate medical care if:    · You develop other symptoms. These may include hearing loss (or worse hearing loss), balance problems, dizziness, nausea, or vomiting. Watch closely for changes in your health, and be sure to contact your doctor if:    · Your tinnitus moves from both ears to one ear.     · Your hearing loss gets worse within 1 day after an ear injury.     · Your tinnitus or hearing loss does not get better within 1 week after an ear injury.     · Your tinnitus bothers you enough that you want to take medicines to help you cope with it. Where can you learn more? Go to http://www.gray.com/  Enter S165 in the search box to learn more about \"Tinnitus: Care Instructions. \"  Current as of: December 2, 2020               Content Version: 13.0  © 6245-4040 [employer]. Care instructions adapted under license by [employer] (which disclaims liability or warranty for this information).  If you have questions about a medical condition or this instruction, always ask your healthcare professional. [place] [address] any warranty or liability for your use of this information.

## 2021-11-17 NOTE — TELEPHONE ENCOUNTER
Faxed and confirmed.      ----- Message from Sylvia Campos NP sent at 11/17/2021 12:15 PM EST -----  Regarding: records request  Please request records for this patient to include any mammograms and cervical cancer/Pap tests    Gyn - [facility], dr Danielle Torres / Latha arshad

## 2021-11-17 NOTE — PROGRESS NOTES
Chief Complaint   Patient presents with    Establish Care    Eye Problem     was seeing prisms in eyes for a couple hours    Ringing in Ear         1. \"Have you been to the ER, urgent care clinic since your last visit? Hospitalized since your last visit? \" Yes [facility]: [facility] Reason for visit: covid-related    2. \"Have you seen or consulted any other health care providers outside of the [address] [place] [address] since your last visit? \" No     3. For patients over 45: Has the patient had a colonoscopy? No     If the patient is female:    4. For patients over 40: Has the patient had a mammogram? Yes, HM satisfied with blue hyperlink    5. For patients over 21: Has the patient had a pap smear?  Yes, HM satisfied with blue hyperlink     Pt declined flu shot      3 most recent PHQ Screens 11/17/2021   Little interest or pleasure in doing things Not at all   Feeling down, depressed, irritable, or hopeless Not at all   Total Score PHQ 2 0       Health Maintenance Due   Topic Date Due    Hepatitis C Screening  Never done    Cervical cancer screen  Never done    Lipid Screen  Never done    Colorectal Cancer Screening Combo  Never done    Flu Vaccine (1) 09/01/2021

## 2021-11-18 LAB
ALBUMIN SERPL-MCNC: 4.5 G/DL (ref 3.8–4.8)
ALBUMIN/GLOB SERPL: 1.5 {RATIO} (ref 1.2–2.2)
ALP SERPL-CCNC: 50 IU/L (ref 44–121)
ALT SERPL-CCNC: 6 IU/L (ref 0–32)
AST SERPL-CCNC: 14 IU/L (ref 0–40)
BASOPHILS # BLD AUTO: 0.1 X10E3/UL (ref 0–0.2)
BASOPHILS NFR BLD AUTO: 1 %
BILIRUB SERPL-MCNC: 0.3 MG/DL (ref 0–1.2)
BUN SERPL-MCNC: 8 MG/DL (ref 6–24)
BUN/CREAT SERPL: 11 (ref 9–23)
CALCIUM SERPL-MCNC: 9.9 MG/DL (ref 8.7–10.2)
CHLORIDE SERPL-SCNC: 102 MMOL/L (ref 96–106)
CHOLEST SERPL-MCNC: 189 MG/DL (ref 100–199)
CO2 SERPL-SCNC: 23 MMOL/L (ref 20–29)
CREAT SERPL-MCNC: 0.71 MG/DL (ref 0.57–1)
EOSINOPHIL # BLD AUTO: 0.1 X10E3/UL (ref 0–0.4)
EOSINOPHIL NFR BLD AUTO: 2 %
ERYTHROCYTE [DISTWIDTH] IN BLOOD BY AUTOMATED COUNT: 13.3 % (ref 11.7–15.4)
EST. AVERAGE GLUCOSE BLD GHB EST-MCNC: 111 MG/DL
GLOBULIN SER CALC-MCNC: 3.1 G/DL (ref 1.5–4.5)
GLUCOSE SERPL-MCNC: 85 MG/DL (ref 65–99)
HBA1C MFR BLD: 5.5 % (ref 4.8–5.6)
HCT VFR BLD AUTO: 39 % (ref 34–46.6)
HCV AB S/CO SERPL IA: <0.1 S/CO RATIO (ref 0–0.9)
HDLC SERPL-MCNC: 79 MG/DL
HGB BLD-MCNC: 12.8 G/DL (ref 11.1–15.9)
IMM GRANULOCYTES # BLD AUTO: 0 X10E3/UL (ref 0–0.1)
IMM GRANULOCYTES NFR BLD AUTO: 0 %
IMP & REVIEW OF LAB RESULTS: NORMAL
LDLC SERPL CALC-MCNC: 95 MG/DL (ref 0–99)
LYMPHOCYTES # BLD AUTO: 2.1 X10E3/UL (ref 0.7–3.1)
LYMPHOCYTES NFR BLD AUTO: 34 %
MCH RBC QN AUTO: 26.9 PG (ref 26.6–33)
MCHC RBC AUTO-ENTMCNC: 32.8 G/DL (ref 31.5–35.7)
MCV RBC AUTO: 82 FL (ref 79–97)
MONOCYTES # BLD AUTO: 0.5 X10E3/UL (ref 0.1–0.9)
MONOCYTES NFR BLD AUTO: 8 %
NEUTROPHILS # BLD AUTO: 3.4 X10E3/UL (ref 1.4–7)
NEUTROPHILS NFR BLD AUTO: 55 %
PLATELET # BLD AUTO: 356 X10E3/UL (ref 150–450)
POTASSIUM SERPL-SCNC: 4.3 MMOL/L (ref 3.5–5.2)
PROT SERPL-MCNC: 7.6 G/DL (ref 6–8.5)
RBC # BLD AUTO: 4.76 X10E6/UL (ref 3.77–5.28)
SODIUM SERPL-SCNC: 138 MMOL/L (ref 134–144)
T4 SERPL-MCNC: 7.1 UG/DL (ref 4.5–12)
TRIGL SERPL-MCNC: 85 MG/DL (ref 0–149)
TSH SERPL DL<=0.005 MIU/L-ACNC: 2.44 UIU/ML (ref 0.45–4.5)
VLDLC SERPL CALC-MCNC: 15 MG/DL (ref 5–40)
WBC # BLD AUTO: 6.2 X10E3/UL (ref 3.4–10.8)

## 2023-02-06 ENCOUNTER — OFFICE VISIT (OUTPATIENT)
Dept: FAMILY MEDICINE CLINIC | Age: 48
End: 2023-02-06

## 2023-02-06 VITALS
SYSTOLIC BLOOD PRESSURE: 111 MMHG | DIASTOLIC BLOOD PRESSURE: 77 MMHG | OXYGEN SATURATION: 99 % | TEMPERATURE: 98.4 F | BODY MASS INDEX: 25.1 KG/M2 | HEIGHT: 64 IN | HEART RATE: 111 BPM | RESPIRATION RATE: 17 BRPM | WEIGHT: 147 LBS

## 2023-02-06 DIAGNOSIS — J45.20 MILD INTERMITTENT ASTHMA, UNSPECIFIED WHETHER COMPLICATED: ICD-10-CM

## 2023-02-06 DIAGNOSIS — I47.1 SVT (SUPRAVENTRICULAR TACHYCARDIA) (HCC): Primary | ICD-10-CM

## 2023-02-06 DIAGNOSIS — R94.31 ABNORMAL EKG: ICD-10-CM

## 2023-02-06 PROBLEM — I47.10 SVT (SUPRAVENTRICULAR TACHYCARDIA) (HCC): Status: ACTIVE | Noted: 2023-02-06

## 2023-02-06 PROCEDURE — 99214 OFFICE O/P EST MOD 30 MIN: CPT | Performed by: STUDENT IN AN ORGANIZED HEALTH CARE EDUCATION/TRAINING PROGRAM

## 2023-02-06 PROCEDURE — 93000 ELECTROCARDIOGRAM COMPLETE: CPT | Performed by: STUDENT IN AN ORGANIZED HEALTH CARE EDUCATION/TRAINING PROGRAM

## 2023-02-06 RX ORDER — METOPROLOL SUCCINATE 25 MG/1
25 TABLET, EXTENDED RELEASE ORAL DAILY
Qty: 30 TABLET | Refills: 5 | OUTPATIENT
Start: 2023-02-06

## 2023-02-06 RX ORDER — METOPROLOL SUCCINATE 25 MG/1
25 TABLET, EXTENDED RELEASE ORAL DAILY
Qty: 30 TABLET | Refills: 5 | Status: SHIPPED | OUTPATIENT
Start: 2023-02-06

## 2023-02-06 NOTE — PROGRESS NOTES
Chief Complaint   Patient presents with    Heart Problem     Patient states have been having tightness on chest, Patient was left with long covid problems and anxiety, patient had some type of anxiety attack while flying on a lay over on thursday. 1. \"Have you been to the ER, urgent care clinic since your last visit? Hospitalized since your last visit? \" Yes When: 02/04/2023 [facility] Reason for visit: Patient needed an EkG    2. \"Have you seen or consulted any other health care providers outside of the [address] [place] [address] since your last visit? \" No     3. For patients aged 39-70: Has the patient had a colonoscopy / FIT/ Cologuard? No      If the patient is female:    4. For patients aged 41-77: Has the patient had a mammogram within the past 2 years? Yes - no Care Gap present      5. For patients aged 21-65: Has the patient had a pap smear?  Yes - no Care Gap present         3 most recent PHQ Screens 2/6/2023   Little interest or pleasure in doing things Not at all   Feeling down, depressed, irritable, or hopeless Not at all   Total Score PHQ 2 0       Health Maintenance Due   Topic Date Due    DTaP/Tdap/Td series (1 - Tdap) Never done    Colorectal Cancer Screening Combo  Never done    COVID-19 Vaccine (3 - Booster for Pfizer series) 06/16/2021    Flu Vaccine (1) 08/01/2022    Depression Screen  11/17/2022

## 2023-02-06 NOTE — PROGRESS NOTES
[facility]      Chief Complaint:     Chief Complaint   Patient presents with    Heart Problem     Patient states have been having tightness on chest, Patient was left with long covid problems and anxiety, patient had some type of anxiety attack while flying on a lay over on thursday. Mika Keller is a 50 y.o. female that presents for: palpitations      Assessment/Plan:     Diagnoses and all orders for this visit:    1. SVT (supraventricular tachycardia) ([address]): Sinus tach on exam/ EKG today, may have had episode of SVT on plane. Ischemic workup/echo 1 year ago negative. Restart Toprol.   -     metoprolol succinate (TOPROL-XL) 25 mg XL tablet; Take 1 Tablet by mouth daily.  -     CBC WITH AUTOMATED DIFF; Future  -     METABOLIC PANEL, COMPREHENSIVE; Future  -     TSH 3RD GENERATION; Future  -     MAGNESIUM; Future  - Follow up with Dr. Liam Weiner    2. Abnormal EKG  -     AMB POC EKG ROUTINE W/ 12 LEADS, INTER & REP         Follow up:      1-2 weeks     Subjective:   HPI:  Mika Keller is a 50 y.o. female with hx of SVT,  family hx of early CAD,  that presents for:    Chest tightness. Seen by Dr. Liam Weiner Cardiology in 2021. Had normal echo 1/2021. Normal stress test. Loop monitor showed SVT. Hx of asthma- not worsened with BB   Having issues with long COVID (had it in 2020)  Was on a flight 5 days ago and had anxiety attack/palpitations, felt hot/agitated , felt near syncopal ; lasted a few seconds. Felt like she needed to catch her breath.    [facility]     Not taking Metoprolol, only took it for about 6 weeks    Health Maintenance:  Health Maintenance Due   Topic Date Due    DTaP/Tdap/Td series (1 - Tdap) Never done    Colorectal Cancer Screening Combo  Never done    COVID-19 Vaccine (3 - Booster for Pfizer series) 06/16/2021    Flu Vaccine (1) 08/01/2022    Depression Screen  11/17/2022        ROS:   See HPI      Past medical history, social history, and medications personally reviewed. Past Medical History:   Diagnosis Date    COVID-19 12/14/2020    Hearing loss in right ear     Meniere disease         Allergies personally reviewed. Allergies   Allergen Reactions    Shellfish Derived Anaphylaxis    Tree Nut Anaphylaxis          Objective:   Vitals reviewed. Visit Vitals  /77 (BP 1 Location: Left upper arm, BP Patient Position: Sitting, BP Cuff Size: Adult)   Pulse (!) 111   Temp 98.4 °F (36.9 °C) (Temporal)   Resp 17   Ht 5' 4\" (1.626 m)   Wt 147 lb (66.7 kg)   LMP 02/05/2023 (Exact Date)   SpO2 99%   BMI 25.23 kg/m²        Wt Readings from Last 3 Encounters:   02/06/23 147 lb (66.7 kg)   11/17/21 170 lb 12.8 oz (77.5 kg)   05/05/21 176 lb (79.8 kg)        Physical Exam  Physical Exam     Vitals: Reviewed. General: AO x 3. No distress. Not diaphoretic. No jaundice. No cyanosis. No pallor. HEENT: No thyromegaly or JVD  Cardio: Tachycardic, regular rhythm. No murmur, rubs, or gallop. Pulmonary: Effort normal. No accessory muscle use. No wheezes, rales, or rhonchi. Extremities: No edema of lower extremities. Pulses 2+. Skin: No rash. I have reviewed pertinent labs results and other data. I have discussed the diagnosis with the patient and the intended plan as seen in the above orders. The patient has received an after-visit summary and questions were answered concerning future plans. I have discussed medication side effects and warnings with the patient as well.     Solitario Stewart DO  02/06/23

## 2023-02-07 LAB
ALBUMIN SERPL-MCNC: 4.4 G/DL (ref 3.5–5)
ALBUMIN/GLOB SERPL: 1 (ref 1.1–2.2)
ALP SERPL-CCNC: 55 U/L (ref 45–117)
ALT SERPL-CCNC: 15 U/L (ref 12–78)
ANION GAP SERPL CALC-SCNC: 1 MMOL/L (ref 5–15)
AST SERPL-CCNC: 11 U/L (ref 15–37)
BASOPHILS # BLD: 0.1 K/UL (ref 0–0.1)
BASOPHILS NFR BLD: 1 % (ref 0–1)
BILIRUB SERPL-MCNC: 0.3 MG/DL (ref 0.2–1)
BUN SERPL-MCNC: 12 MG/DL (ref 6–20)
BUN/CREAT SERPL: 13 (ref 12–20)
CALCIUM SERPL-MCNC: 10.1 MG/DL (ref 8.5–10.1)
CHLORIDE SERPL-SCNC: 105 MMOL/L (ref 97–108)
CO2 SERPL-SCNC: 29 MMOL/L (ref 21–32)
CREAT SERPL-MCNC: 0.94 MG/DL (ref 0.55–1.02)
DIFFERENTIAL METHOD BLD: ABNORMAL
EOSINOPHIL # BLD: 0.1 K/UL (ref 0–0.4)
EOSINOPHIL NFR BLD: 1 % (ref 0–7)
ERYTHROCYTE [DISTWIDTH] IN BLOOD BY AUTOMATED COUNT: 13.7 % (ref 11.5–14.5)
GLOBULIN SER CALC-MCNC: 4.2 G/DL (ref 2–4)
GLUCOSE SERPL-MCNC: 90 MG/DL (ref 65–100)
HCT VFR BLD AUTO: 44.5 % (ref 35–47)
HGB BLD-MCNC: 13.5 G/DL (ref 11.5–16)
IMM GRANULOCYTES # BLD AUTO: 0 K/UL (ref 0–0.04)
IMM GRANULOCYTES NFR BLD AUTO: 1 % (ref 0–0.5)
LYMPHOCYTES # BLD: 2 K/UL (ref 0.8–3.5)
LYMPHOCYTES NFR BLD: 31 % (ref 12–49)
MAGNESIUM SERPL-MCNC: 2.1 MG/DL (ref 1.6–2.4)
MCH RBC QN AUTO: 26.4 PG (ref 26–34)
MCHC RBC AUTO-ENTMCNC: 30.3 G/DL (ref 30–36.5)
MCV RBC AUTO: 86.9 FL (ref 80–99)
MONOCYTES # BLD: 0.5 K/UL (ref 0–1)
MONOCYTES NFR BLD: 8 % (ref 5–13)
NEUTS SEG # BLD: 3.8 K/UL (ref 1.8–8)
NEUTS SEG NFR BLD: 58 % (ref 32–75)
NRBC # BLD: 0 K/UL (ref 0–0.01)
NRBC BLD-RTO: 0 PER 100 WBC
PLATELET # BLD AUTO: 434 K/UL (ref 150–400)
PMV BLD AUTO: 10.7 FL (ref 8.9–12.9)
POTASSIUM SERPL-SCNC: 4.7 MMOL/L (ref 3.5–5.1)
PROT SERPL-MCNC: 8.6 G/DL (ref 6.4–8.2)
RBC # BLD AUTO: 5.12 M/UL (ref 3.8–5.2)
SODIUM SERPL-SCNC: 135 MMOL/L (ref 136–145)
TSH SERPL DL<=0.05 MIU/L-ACNC: 1.64 UIU/ML (ref 0.36–3.74)
WBC # BLD AUTO: 6.4 K/UL (ref 3.6–11)

## 2024-11-25 NOTE — TELEPHONE ENCOUNTER
Patient called was given appointment the soon 01/20/2025 with DENISE Bonilla but has ran out her metoprolol succinate 25 mg wants to know if she could get enough till she come in.    Best call back #[phone]

## 2024-11-25 NOTE — TELEPHONE ENCOUNTER
Patient called was given appointment the soon 01/20/2025 with DENISE Bonilla but has ran out her metoprolol succinate 25 mg wants to know if she could get enough till she come in.    Best call back #[phone]     Contacted patient to check status as last appt/rx back in 2/2023- patient stated has been in [place] and just returned- stated unable to obtain from [place] provider due to insurance change. Confirmed dose and pharmacy w/patient.   Thanks, Calista    Last appointment: 2/6/23 [facility]  Next appointment: 1/20/25 [facility]  Previous refill encounter(s): 2/6/23 30 + 5    Requested Prescriptions     Pending Prescriptions Disp Refills    metoprolol succinate (TOPROL XL) 25 MG extended release tablet 30 tablet 1     Sig: Take 1 tablet by mouth daily     For Pharmacy Admin Tracking Only    Program: Medication Refill  CPA in place:    Recommendation Provided To:   Intervention Detail: New Rx: 1, reason: Patient Preference  Intervention Accepted By:   Gap Closed?:    Time Spent (min): 5

## 2024-11-26 RX ORDER — METOPROLOL SUCCINATE 25 MG/1
25 TABLET, EXTENDED RELEASE ORAL DAILY
Qty: 30 TABLET | Refills: 1 | OUTPATIENT
Start: 2024-11-26